# Patient Record
Sex: FEMALE | Race: WHITE | NOT HISPANIC OR LATINO | URBAN - METROPOLITAN AREA
[De-identification: names, ages, dates, MRNs, and addresses within clinical notes are randomized per-mention and may not be internally consistent; named-entity substitution may affect disease eponyms.]

---

## 2023-09-18 PROBLEM — E66.01 MORBID OBESITY (MULTI): Status: ACTIVE | Noted: 2023-09-18

## 2023-09-18 PROBLEM — F41.9 ANXIETY: Status: ACTIVE | Noted: 2023-09-18

## 2023-09-18 PROBLEM — S46.811A STRAIN OF RIGHT TRAPEZIUS MUSCLE: Status: ACTIVE | Noted: 2023-05-28

## 2023-09-18 PROBLEM — T78.1XXA ADVERSE REACTION TO FOOD: Status: ACTIVE | Noted: 2023-09-18

## 2023-09-18 PROBLEM — T17.308A CHOKING: Status: ACTIVE | Noted: 2023-09-18

## 2023-09-18 PROBLEM — N92.6 ABNORMAL BLEEDING IN MENSTRUAL CYCLE: Status: ACTIVE | Noted: 2023-09-18

## 2023-09-18 PROBLEM — N91.2 AMENORRHEA: Status: ACTIVE | Noted: 2023-09-18

## 2023-09-18 RX ORDER — DESOGESTREL AND ETHINYL ESTRADIOL 0.15-0.03
1 KIT ORAL DAILY
COMMUNITY
Start: 2022-08-12 | End: 2023-10-23

## 2023-09-18 RX ORDER — NORETHINDRONE 0.35 MG/1
1 TABLET ORAL DAILY
COMMUNITY
Start: 2022-01-03 | End: 2023-12-13 | Stop reason: WASHOUT

## 2023-09-18 RX ORDER — IBUPROFEN 200 MG
600 TABLET ORAL EVERY 6 HOURS PRN
COMMUNITY

## 2023-09-18 RX ORDER — SERTRALINE HYDROCHLORIDE 50 MG/1
50 TABLET, FILM COATED ORAL DAILY
COMMUNITY
Start: 2022-08-12 | End: 2023-10-23

## 2023-09-18 RX ORDER — TIZANIDINE 4 MG/1
4 TABLET ORAL NIGHTLY PRN
COMMUNITY
Start: 2023-05-18 | End: 2023-12-13 | Stop reason: WASHOUT

## 2023-12-08 NOTE — PROGRESS NOTES
Annual  Subjective   Praveena Fermin is a 43 y.o. female who is here for a routine exam.     Complaints: none  Periods: regular  Dysmenorrhea: none    Current contraception: OCP  History of abnormal Pap smear: no  History of abnormal mammogram: no      OB History    No obstetric history on file.          Review of Systems    Objective   There were no vitals taken for this visit.       General:   Alert and oriented, in no acute distress   Neck: Supple. No visible thyromegaly.    Breast/Axilla: Normal to palpation bilaterally without masses, skin changes, or nipple discharge.    Abdomen: Soft, non-tender, without masses or organomegaly   Vulva: Normal architecture without erythema, masses, or lesions.    Vagina: Normal mucosa without lesions, masses, or atrophy. No abnormal vaginal discharge.    Cervix: Normal without masses, lesions, or signs of cervicitis   Uterus: Normal, mobile, non-enlarged uterus   Adnexa: Normal without masses or lesions   Pelvic Floor normal   Psych Normal affect. Normal mood.      Assessment/Plan   Diagnoses and all orders for this visit:  Encounter for gynecological examination without abnormal finding  -     THINPREP PAP TEST  -     sertraline (Zoloft) 50 mg tablet; Take 1 tablet (50 mg) by mouth once daily.  -     desogestreL-ethinyl estradioL (Apri) 0.15-0.03 mg tablet; Take 1 tablet by mouth once daily.  Screening mammogram for breast cancer  -     BI mammo bilateral screening tomosynthesis; Future    Routine annual    Pap due  Mammogram    Yolis Hoffmann MD

## 2023-12-13 ENCOUNTER — OFFICE VISIT (OUTPATIENT)
Dept: OBSTETRICS AND GYNECOLOGY | Facility: CLINIC | Age: 43
End: 2023-12-13
Payer: COMMERCIAL

## 2023-12-13 VITALS
DIASTOLIC BLOOD PRESSURE: 80 MMHG | HEIGHT: 65 IN | SYSTOLIC BLOOD PRESSURE: 128 MMHG | BODY MASS INDEX: 43.65 KG/M2 | WEIGHT: 262 LBS

## 2023-12-13 DIAGNOSIS — Z12.31 SCREENING MAMMOGRAM FOR BREAST CANCER: ICD-10-CM

## 2023-12-13 DIAGNOSIS — Z01.419 ENCOUNTER FOR GYNECOLOGICAL EXAMINATION WITHOUT ABNORMAL FINDING: Primary | ICD-10-CM

## 2023-12-13 PROCEDURE — 88175 CYTOPATH C/V AUTO FLUID REDO: CPT | Mod: TC,GCY | Performed by: OBSTETRICS & GYNECOLOGY

## 2023-12-13 PROCEDURE — 87624 HPV HI-RISK TYP POOLED RSLT: CPT | Performed by: OBSTETRICS & GYNECOLOGY

## 2023-12-13 PROCEDURE — 99396 PREV VISIT EST AGE 40-64: CPT | Mod: MUE | Performed by: OBSTETRICS & GYNECOLOGY

## 2023-12-13 PROCEDURE — 1036F TOBACCO NON-USER: CPT | Performed by: OBSTETRICS & GYNECOLOGY

## 2023-12-13 PROCEDURE — 99396 PREV VISIT EST AGE 40-64: CPT | Performed by: OBSTETRICS & GYNECOLOGY

## 2023-12-13 RX ORDER — FLUOCINONIDE 0.5 MG/G
1 CREAM TOPICAL 2 TIMES DAILY
COMMUNITY
Start: 2023-06-12

## 2023-12-13 RX ORDER — DESOGESTREL AND ETHINYL ESTRADIOL 0.15-0.03
1 KIT ORAL DAILY
Qty: 84 TABLET | Refills: 0 | Status: SHIPPED | OUTPATIENT
Start: 2023-12-13 | End: 2024-12-12

## 2023-12-13 RX ORDER — SERTRALINE HYDROCHLORIDE 50 MG/1
50 TABLET, FILM COATED ORAL DAILY
Qty: 90 TABLET | Refills: 3 | Status: SHIPPED | OUTPATIENT
Start: 2023-12-13 | End: 2024-12-12

## 2023-12-13 ASSESSMENT — LIFESTYLE VARIABLES
SKIP TO QUESTIONS 9-10: 1
AUDIT-C TOTAL SCORE: 1
HOW MANY STANDARD DRINKS CONTAINING ALCOHOL DO YOU HAVE ON A TYPICAL DAY: 1 OR 2
HOW OFTEN DO YOU HAVE SIX OR MORE DRINKS ON ONE OCCASION: NEVER
HOW OFTEN DO YOU HAVE A DRINK CONTAINING ALCOHOL: MONTHLY OR LESS

## 2023-12-13 ASSESSMENT — PATIENT HEALTH QUESTIONNAIRE - PHQ9
2. FEELING DOWN, DEPRESSED OR HOPELESS: NOT AT ALL
SUM OF ALL RESPONSES TO PHQ9 QUESTIONS 1 & 2: 0
1. LITTLE INTEREST OR PLEASURE IN DOING THINGS: NOT AT ALL

## 2023-12-13 ASSESSMENT — ENCOUNTER SYMPTOMS
DEPRESSION: 0
OCCASIONAL FEELINGS OF UNSTEADINESS: 0
LOSS OF SENSATION IN FEET: 0

## 2023-12-13 ASSESSMENT — PAIN SCALES - GENERAL: PAINLEVEL: 0-NO PAIN

## 2024-01-08 LAB
CYTOLOGY CMNT CVX/VAG CYTO-IMP: NORMAL
HPV HR 12 DNA GENITAL QL NAA+PROBE: NEGATIVE
HPV HR GENOTYPES PNL CVX NAA+PROBE: NEGATIVE
HPV16 DNA SPEC QL NAA+PROBE: NEGATIVE
HPV18 DNA SPEC QL NAA+PROBE: NEGATIVE
LAB AP HPV GENOTYPE QUESTION: YES
LAB AP HPV HR: NORMAL
LABORATORY COMMENT REPORT: NORMAL
PATH REPORT.TOTAL CANCER: NORMAL

## 2024-03-29 ENCOUNTER — APPOINTMENT (OUTPATIENT)
Dept: PRIMARY CARE | Facility: CLINIC | Age: 44
End: 2024-03-29
Payer: COMMERCIAL

## 2024-03-29 ENCOUNTER — HOSPITAL ENCOUNTER (OUTPATIENT)
Dept: RADIOLOGY | Facility: CLINIC | Age: 44
Discharge: HOME | End: 2024-03-29
Payer: COMMERCIAL

## 2024-03-29 VITALS — WEIGHT: 260 LBS | BODY MASS INDEX: 43.32 KG/M2 | HEIGHT: 65 IN

## 2024-03-29 DIAGNOSIS — Z12.31 SCREENING MAMMOGRAM FOR BREAST CANCER: ICD-10-CM

## 2024-04-01 ENCOUNTER — APPOINTMENT (OUTPATIENT)
Dept: PRIMARY CARE | Facility: CLINIC | Age: 44
End: 2024-04-01
Payer: COMMERCIAL

## 2024-04-05 ENCOUNTER — APPOINTMENT (OUTPATIENT)
Dept: RADIOLOGY | Facility: CLINIC | Age: 44
End: 2024-04-05
Payer: COMMERCIAL

## 2024-04-12 ENCOUNTER — HOSPITAL ENCOUNTER (OUTPATIENT)
Dept: RADIOLOGY | Facility: CLINIC | Age: 44
Discharge: HOME | End: 2024-04-12
Payer: COMMERCIAL

## 2024-04-12 VITALS — HEIGHT: 65 IN | WEIGHT: 260 LBS | BODY MASS INDEX: 43.32 KG/M2

## 2024-04-12 PROCEDURE — 77063 BREAST TOMOSYNTHESIS BI: CPT | Performed by: RADIOLOGY

## 2024-04-12 PROCEDURE — 77067 SCR MAMMO BI INCL CAD: CPT | Performed by: RADIOLOGY

## 2024-04-12 PROCEDURE — 77067 SCR MAMMO BI INCL CAD: CPT

## 2024-06-03 ENCOUNTER — APPOINTMENT (OUTPATIENT)
Dept: PRIMARY CARE | Facility: CLINIC | Age: 44
End: 2024-06-03
Payer: COMMERCIAL

## 2024-07-08 ENCOUNTER — APPOINTMENT (OUTPATIENT)
Dept: PRIMARY CARE | Facility: CLINIC | Age: 44
End: 2024-07-08
Payer: COMMERCIAL

## 2024-07-29 ENCOUNTER — APPOINTMENT (OUTPATIENT)
Dept: PRIMARY CARE | Facility: CLINIC | Age: 44
End: 2024-07-29
Payer: COMMERCIAL

## 2024-09-16 ENCOUNTER — APPOINTMENT (OUTPATIENT)
Dept: PRIMARY CARE | Facility: CLINIC | Age: 44
End: 2024-09-16
Payer: COMMERCIAL

## 2024-11-07 ENCOUNTER — APPOINTMENT (OUTPATIENT)
Dept: PRIMARY CARE | Facility: CLINIC | Age: 44
End: 2024-11-07
Payer: COMMERCIAL

## 2024-11-07 VITALS
WEIGHT: 265 LBS | SYSTOLIC BLOOD PRESSURE: 126 MMHG | TEMPERATURE: 98.1 F | HEART RATE: 98 BPM | BODY MASS INDEX: 44.1 KG/M2 | DIASTOLIC BLOOD PRESSURE: 80 MMHG | OXYGEN SATURATION: 99 %

## 2024-11-07 DIAGNOSIS — R10.13 EPIGASTRIC PAIN: Primary | ICD-10-CM

## 2024-11-07 PROCEDURE — 99214 OFFICE O/P EST MOD 30 MIN: CPT | Performed by: FAMILY MEDICINE

## 2024-11-07 ASSESSMENT — PAIN SCALES - GENERAL: PAINLEVEL_OUTOF10: 0-NO PAIN

## 2024-11-07 NOTE — PROGRESS NOTES
"Subjective   Patient ID: Praveena Fermin \"Ricky" is a 44 y.o. female who presents for Abdominal Pain (Off/on for some time. Middle of abdomen near belly button. Denies nausea vomiting or diarrhea. ).    Luz Fermin is seen for her epigastric pain.     -Patient reports that she intermittently has episodes of epigastric pain radiating away from midline. Episodes used to occur months apart and possibly associated with ovulation, but now episodes are occurring on a weekly basis. She describes the pain as a constant sharp, gnawing pain that is not associated with any known triggers like food, activities or other events. Pain is worse when standing and better when resting.   -Patient is currently not having an episode in office today. Last episode occurred 9 days ago and lasted 4 days.   -Denies constipation, diarrhea, nausea, vomiting and any other associated symptoms during the episodes.     Review of Systems   All other systems reviewed and are negative.      Objective  Vitals:  /80   Pulse 98   Temp 36.7 °C (98.1 °F)   Wt 120 kg (265 lb)   LMP 10/29/2024 (Approximate)   SpO2 99%   BMI 44.10 kg/m²     Physical Exam  Vitals and nursing note reviewed.   Constitutional:       Appearance: Normal appearance.   Eyes:      Pupils: Pupils are equal, round, and reactive to light.   Pulmonary:      Effort: Pulmonary effort is normal. No respiratory distress.   Abdominal:      General: Abdomen is flat.      Palpations: Abdomen is soft.      Tenderness: There is no abdominal tenderness. There is no guarding or rebound.      Hernia: No hernia is present.   Musculoskeletal:         General: Normal range of motion.      Cervical back: Normal range of motion.   Skin:     Findings: No rash.   Neurological:      Mental Status: She is alert and oriented to person, place, and time.   Psychiatric:         Mood and Affect: Mood normal.         Assessment/Plan   Assessment & Plan  Epigastric pain  -Considered diagnoses " include biliary etiologies (bilary colic) vs pancreatic etiologies (cyst vs pancreatitis) vs hernia.  -Plan to initiate workup with RUQ US to evaluate for gallbladder or liver etiology.   -Advised to obtain ordered blood work during an episode of her pain to evaluate for elevations in liver and pancreatic enzyme levels.   -Patient advised to follow up if pain worsens or becomes more persistent.     Orders:    CBC and Auto Differential; Future    Comprehensive Metabolic Panel; Future    Amylase; Future    Lipase; Future    US right upper quadrant; Future        Follow up PRN, sooner with any problems or concerns.       11/07/24 at 1:11 PM - SRIRAM JACKSON    I was present with the medical student who participated in the documentation of this note. I have personally seen and examined the patient and performed the medical decision-making components. I have reviewed the medical student documentation and verified the findings in the note as written with additions or exceptions as stated in the body of the note.  Rosaura Segovia MD

## 2024-11-11 ENCOUNTER — HOSPITAL ENCOUNTER (OUTPATIENT)
Dept: RADIOLOGY | Facility: CLINIC | Age: 44
Discharge: HOME | End: 2024-11-11
Payer: COMMERCIAL

## 2024-11-11 DIAGNOSIS — R10.13 EPIGASTRIC PAIN: ICD-10-CM

## 2024-11-11 PROCEDURE — 76705 ECHO EXAM OF ABDOMEN: CPT | Performed by: RADIOLOGY

## 2024-11-11 PROCEDURE — 76705 ECHO EXAM OF ABDOMEN: CPT

## 2024-11-12 DIAGNOSIS — R10.13 EPIGASTRIC PAIN: ICD-10-CM

## 2024-12-11 NOTE — PROGRESS NOTES
"Annual  Subjective   HPI:  Praveena Fermin is a 44 y.o. female  Patient's last menstrual period was 2024 (exact date). for annual exam.    Complaints:   had upper quadrant pain with normal US; had kidney stone  Periods: regular but can be 2-6 days different  Dysmenorrhea:  none    GYNH:   Current contraceptive   vasectomy  Last pap  2023  History of abnormal Pap smear:   no  Last mammogram  2024  History of abnormal mammogram:        OB History          6    Para   4    Term   4       0    AB   2    Living   4         SAB   2    IAB   0    Ectopic   0    Multiple   0    Live Births   4                  Past Medical History:   Diagnosis Date    Anxiety     Kidney stone        History reviewed. No pertinent surgical history.    Prior to Admission medications    Medication Sig Start Date End Date Taking? Authorizing Provider   fluocinonide 0.05 % cream Apply 1 Application topically 2 times a day. 23   Historical Provider, MD   ibuprofen 200 mg tablet Take 3 tablets (600 mg) by mouth every 6 hours if needed for mild pain (1 - 3).    Historical Provider, MD   sertraline (Zoloft) 50 mg tablet Take 1 tablet (50 mg) by mouth once daily. 23  Yolis Hoffmann MD       Social History     Tobacco Use    Smoking status: Never    Smokeless tobacco: Never   Vaping Use    Vaping status: Never Used   Substance Use Topics    Alcohol use: Yes    Drug use: Never        Objective   /74   Ht 1.651 m (5' 5\")   Wt 120 kg (265 lb)   LMP 2024 (Exact Date)   BMI 44.10 kg/m²      General:   Alert and oriented, in no acute distress   Neck: Supple. No visible thyromegaly.    Breast/Axilla: Normal to palpation bilaterally without masses, skin changes, or nipple discharge.    Abdomen: Soft, non-tender, without masses or organomegaly   Vulva: Normal architecture without erythema, masses, or lesions.    Vagina: Normal mucosa without lesions, masses, or atrophy. No abnormal " vaginal discharge.    Cervix: Normal without masses, lesions, or signs of cervicitis   Uterus: Normal, mobile, non-enlarged uterus   Adnexa: Normal without masses or lesions   Pelvic Floor normal   Psych Normal affect. Normal mood.      Assessment/Plan   Assessment & Plan  Encounter for gynecological examination without abnormal finding    Orders:    sertraline (Zoloft) 50 mg tablet; Take 1 tablet (50 mg) by mouth once daily.    Screening mammogram for breast cancer    Orders:    BI mammo bilateral screening tomosynthesis; Future      Routine annual  OB/GYN Preventive:     - Pap smear indicated every 3-5 years if normal and otherwise low risk   - Self breast exam monthly and clinical breast examination/mammogram yearly   - Screening colonoscopy recommended starting age 45, then Q3-10 years depending on testing and family history   - Genitourinary skin hygiene discussed.  - Diet/Weight management discussed.  - Exercise 30-60 minutes 3-5 times/day    Yolis Hoffmann MD

## 2024-12-16 ENCOUNTER — OFFICE VISIT (OUTPATIENT)
Dept: OBSTETRICS AND GYNECOLOGY | Facility: CLINIC | Age: 44
End: 2024-12-16
Payer: COMMERCIAL

## 2024-12-16 VITALS
HEIGHT: 65 IN | WEIGHT: 265 LBS | BODY MASS INDEX: 44.15 KG/M2 | DIASTOLIC BLOOD PRESSURE: 74 MMHG | SYSTOLIC BLOOD PRESSURE: 122 MMHG

## 2024-12-16 DIAGNOSIS — Z12.31 SCREENING MAMMOGRAM FOR BREAST CANCER: ICD-10-CM

## 2024-12-16 DIAGNOSIS — Z01.419 ENCOUNTER FOR GYNECOLOGICAL EXAMINATION WITHOUT ABNORMAL FINDING: Primary | ICD-10-CM

## 2024-12-16 PROCEDURE — 3008F BODY MASS INDEX DOCD: CPT | Performed by: OBSTETRICS & GYNECOLOGY

## 2024-12-16 PROCEDURE — 1036F TOBACCO NON-USER: CPT | Performed by: OBSTETRICS & GYNECOLOGY

## 2024-12-16 PROCEDURE — 99396 PREV VISIT EST AGE 40-64: CPT | Performed by: OBSTETRICS & GYNECOLOGY

## 2024-12-16 RX ORDER — SERTRALINE HYDROCHLORIDE 50 MG/1
50 TABLET, FILM COATED ORAL DAILY
Qty: 90 TABLET | Refills: 3 | Status: SHIPPED | OUTPATIENT
Start: 2024-12-16 | End: 2025-12-16

## 2024-12-16 ASSESSMENT — PATIENT HEALTH QUESTIONNAIRE - PHQ9
SUM OF ALL RESPONSES TO PHQ9 QUESTIONS 1 & 2: 0
1. LITTLE INTEREST OR PLEASURE IN DOING THINGS: NOT AT ALL
2. FEELING DOWN, DEPRESSED OR HOPELESS: NOT AT ALL

## 2024-12-16 ASSESSMENT — LIFESTYLE VARIABLES
SKIP TO QUESTIONS 9-10: 1
HOW OFTEN DO YOU HAVE A DRINK CONTAINING ALCOHOL: MONTHLY OR LESS
HOW MANY STANDARD DRINKS CONTAINING ALCOHOL DO YOU HAVE ON A TYPICAL DAY: 1 OR 2
AUDIT-C TOTAL SCORE: 1
HOW OFTEN DO YOU HAVE SIX OR MORE DRINKS ON ONE OCCASION: NEVER

## 2024-12-16 ASSESSMENT — ENCOUNTER SYMPTOMS
LOSS OF SENSATION IN FEET: 0
DEPRESSION: 0
OCCASIONAL FEELINGS OF UNSTEADINESS: 0

## 2024-12-16 ASSESSMENT — PAIN SCALES - GENERAL: PAINLEVEL_OUTOF10: 0-NO PAIN

## 2024-12-23 ENCOUNTER — APPOINTMENT (OUTPATIENT)
Dept: PRIMARY CARE | Facility: CLINIC | Age: 44
End: 2024-12-23
Payer: COMMERCIAL

## 2025-01-16 ENCOUNTER — APPOINTMENT (OUTPATIENT)
Dept: GASTROENTEROLOGY | Facility: CLINIC | Age: 45
End: 2025-01-16
Payer: COMMERCIAL

## 2025-02-04 ENCOUNTER — APPOINTMENT (OUTPATIENT)
Dept: PRIMARY CARE | Facility: CLINIC | Age: 45
End: 2025-02-04
Payer: COMMERCIAL

## 2025-02-20 ENCOUNTER — PATIENT MESSAGE (OUTPATIENT)
Dept: OBSTETRICS AND GYNECOLOGY | Facility: CLINIC | Age: 45
End: 2025-02-20
Payer: COMMERCIAL

## 2025-03-20 ENCOUNTER — OFFICE VISIT (OUTPATIENT)
Dept: GASTROENTEROLOGY | Facility: CLINIC | Age: 45
End: 2025-03-20
Payer: COMMERCIAL

## 2025-03-20 VITALS
SYSTOLIC BLOOD PRESSURE: 115 MMHG | BODY MASS INDEX: 40.98 KG/M2 | DIASTOLIC BLOOD PRESSURE: 78 MMHG | HEIGHT: 65 IN | TEMPERATURE: 97.2 F | WEIGHT: 246 LBS | HEART RATE: 92 BPM

## 2025-03-20 DIAGNOSIS — R10.13 EPIGASTRIC PAIN: ICD-10-CM

## 2025-03-20 DIAGNOSIS — R10.32 LEFT LOWER QUADRANT ABDOMINAL PAIN: ICD-10-CM

## 2025-03-20 DIAGNOSIS — R13.14 PHARYNGOESOPHAGEAL DYSPHAGIA: Primary | ICD-10-CM

## 2025-03-20 PROCEDURE — 3008F BODY MASS INDEX DOCD: CPT | Performed by: NURSE PRACTITIONER

## 2025-03-20 PROCEDURE — 1036F TOBACCO NON-USER: CPT | Performed by: NURSE PRACTITIONER

## 2025-03-20 PROCEDURE — 99214 OFFICE O/P EST MOD 30 MIN: CPT | Performed by: NURSE PRACTITIONER

## 2025-03-20 PROCEDURE — 99204 OFFICE O/P NEW MOD 45 MIN: CPT | Performed by: NURSE PRACTITIONER

## 2025-03-20 RX ORDER — DICYCLOMINE HYDROCHLORIDE 10 MG/1
10 CAPSULE ORAL
Qty: 120 CAPSULE | Refills: 11 | Status: SHIPPED | OUTPATIENT
Start: 2025-03-20 | End: 2026-03-20

## 2025-03-20 ASSESSMENT — ENCOUNTER SYMPTOMS
PSYCHIATRIC NEGATIVE: 1
ABDOMINAL PAIN: 1
CARDIOVASCULAR NEGATIVE: 1
ALLERGIC/IMMUNOLOGIC NEGATIVE: 1
CONSTITUTIONAL NEGATIVE: 1
EYES NEGATIVE: 1
NEUROLOGICAL NEGATIVE: 1
MUSCULOSKELETAL NEGATIVE: 1
HEMATOLOGIC/LYMPHATIC NEGATIVE: 1
ENDOCRINE NEGATIVE: 1
RESPIRATORY NEGATIVE: 1

## 2025-03-20 NOTE — PATIENT INSTRUCTIONS
LLQ pain suspicious for diverticulitis- I will order a ct scan of the abd/pelvis for this. Please increase the fiber with benefiber one tablespoon in a glass of water or juice daily to help prevent stool from getting stuck in these pockets. You can use dicyclomine as needed for abd pain.    Dysphagia- you have had food bolus' get stuck over the last 10 years at interval and this is concerning for Eosinophillic esophagitis- I will order an EGD for evaluation of the upper GI tractt.     Please follow up with the GI clinic after your testing

## 2025-03-20 NOTE — PROGRESS NOTES
"Subjective   Patient ID: Praveena Fermin \"Ricky" is a 44 y.o. female who presents for No chief complaint on file..  HPI  44-year-old female for new patient visit for evaluation of intermittent abdominal pain  FHX: maternal grandfather- colon cancer age 80's  Labs reviewed 12/31/2022  H&H 11.7 and 37.1  MCV 71.5  Normal LFTs  11/11/2024 ultrasound of the right upper quadrant showed a mildly enlarged liver with mild fatty infiltrates liver measures 18.4 cm  Intermittent left sided abd pain  Doesn't change with food or BM  Bm: daily  and complete  Some fruits/vegetables  Rest makes it better at times depending on the severity  Has been having since Oct last year   Has been having dysphagia for a while, has had bread and meat got stuck and had to cough it out.  Was seen in the ER for food  No n/v  No fiber supplement  4 children and sedentary lifestyle  No reflux  Started about 10 years after eating steak and then would drink water and could cough it out  Tries to eat smaller pieces and  takes  drinks  LMP      Review of Systems   Constitutional: Negative.    HENT: Negative.     Eyes: Negative.    Respiratory: Negative.     Cardiovascular: Negative.    Gastrointestinal:  Positive for abdominal pain.   Endocrine: Negative.    Genitourinary: Negative.    Musculoskeletal: Negative.    Skin: Negative.    Allergic/Immunologic: Negative.    Neurological: Negative.    Hematological: Negative.    Psychiatric/Behavioral: Negative.         Objective   Physical Exam  Constitutional:       Appearance: Normal appearance.   HENT:      Head: Normocephalic.      Nose: Nose normal.      Mouth/Throat:      Mouth: Mucous membranes are moist.   Eyes:      Pupils: Pupils are equal, round, and reactive to light.   Cardiovascular:      Rate and Rhythm: Normal rate and regular rhythm.      Pulses: Normal pulses.      Heart sounds: Normal heart sounds.   Pulmonary:      Effort: Pulmonary effort is normal.   Abdominal:      General: Bowel " sounds are normal.      Palpations: Abdomen is soft.      Tenderness: There is abdominal tenderness.      Comments: LLQ pain   Musculoskeletal:         General: Normal range of motion.      Cervical back: Normal range of motion and neck supple.   Skin:     General: Skin is warm and dry.   Neurological:      Mental Status: She is alert.   Psychiatric:         Mood and Affect: Mood normal.         Assessment/Plan        LLQ pain suspicious for diverticulitis- I will order a ct scan of the abd/pelvis for this. Please increase the fiber with benefiber one tablespoon in a glass of water or juice daily to help prevent stool from getting stuck in these pockets. You can use dicyclomine as needed for abd pain.    Dysphagia- you have had food bolus' get stuck over the last 10 years at interval and this is concerning for Eosinophillic esophagitis- I will order an EGD for evaluation of the upper GI tractt.     Please follow up with the GI clinic after your testing    SHIRIN Andrews 03/20/25 10:42 AM

## 2025-03-21 LAB — B-HCG UR QL: NEGATIVE

## 2025-04-01 ENCOUNTER — HOSPITAL ENCOUNTER (OUTPATIENT)
Dept: RADIOLOGY | Facility: CLINIC | Age: 45
Discharge: HOME | End: 2025-04-01
Payer: COMMERCIAL

## 2025-04-01 DIAGNOSIS — R10.32 LEFT LOWER QUADRANT ABDOMINAL PAIN: ICD-10-CM

## 2025-04-01 PROCEDURE — 2550000001 HC RX 255 CONTRASTS: Mod: SE | Performed by: FAMILY MEDICINE

## 2025-04-01 PROCEDURE — 74177 CT ABD & PELVIS W/CONTRAST: CPT | Performed by: RADIOLOGY

## 2025-04-01 PROCEDURE — 74177 CT ABD & PELVIS W/CONTRAST: CPT

## 2025-04-01 RX ADMIN — IOHEXOL 75 ML: 350 INJECTION, SOLUTION INTRAVENOUS at 15:55

## 2025-04-08 ENCOUNTER — APPOINTMENT (OUTPATIENT)
Dept: RADIOLOGY | Facility: CLINIC | Age: 45
End: 2025-04-08
Payer: COMMERCIAL

## 2025-04-15 ENCOUNTER — TELEPHONE (OUTPATIENT)
Dept: PRIMARY CARE | Facility: CLINIC | Age: 45
End: 2025-04-15
Payer: COMMERCIAL

## 2025-04-15 ENCOUNTER — PATIENT MESSAGE (OUTPATIENT)
Dept: PRIMARY CARE | Facility: CLINIC | Age: 45
End: 2025-04-15
Payer: COMMERCIAL

## 2025-04-15 DIAGNOSIS — E78.00 HYPERCHOLESTEROLEMIA: Primary | ICD-10-CM

## 2025-04-15 DIAGNOSIS — Z00.00 ANNUAL PHYSICAL EXAM: ICD-10-CM

## 2025-04-16 ENCOUNTER — APPOINTMENT (OUTPATIENT)
Dept: RADIOLOGY | Facility: CLINIC | Age: 45
End: 2025-04-16
Payer: COMMERCIAL

## 2025-04-18 ENCOUNTER — HOSPITAL ENCOUNTER (OUTPATIENT)
Dept: RADIOLOGY | Facility: CLINIC | Age: 45
Discharge: HOME | End: 2025-04-18
Payer: COMMERCIAL

## 2025-04-18 DIAGNOSIS — Z12.31 SCREENING MAMMOGRAM FOR BREAST CANCER: ICD-10-CM

## 2025-04-18 PROCEDURE — 77067 SCR MAMMO BI INCL CAD: CPT

## 2025-04-18 PROCEDURE — 77063 BREAST TOMOSYNTHESIS BI: CPT | Performed by: RADIOLOGY

## 2025-04-18 PROCEDURE — 77067 SCR MAMMO BI INCL CAD: CPT | Performed by: RADIOLOGY

## 2025-04-21 ENCOUNTER — APPOINTMENT (OUTPATIENT)
Dept: PRIMARY CARE | Facility: CLINIC | Age: 45
End: 2025-04-21
Payer: COMMERCIAL

## 2025-04-24 ENCOUNTER — HOSPITAL ENCOUNTER (OUTPATIENT)
Dept: RADIOLOGY | Facility: CLINIC | Age: 45
Discharge: HOME | End: 2025-04-24
Payer: COMMERCIAL

## 2025-04-24 DIAGNOSIS — E78.00 HYPERCHOLESTEROLEMIA: ICD-10-CM

## 2025-04-24 PROCEDURE — 75571 CT HRT W/O DYE W/CA TEST: CPT

## 2025-05-09 DIAGNOSIS — Z00.00 ANNUAL PHYSICAL EXAM: ICD-10-CM

## 2025-06-02 ENCOUNTER — APPOINTMENT (OUTPATIENT)
Dept: GASTROENTEROLOGY | Facility: CLINIC | Age: 45
End: 2025-06-02
Payer: COMMERCIAL

## 2025-06-02 VITALS — WEIGHT: 268 LBS | OXYGEN SATURATION: 94 % | HEIGHT: 65 IN | HEART RATE: 92 BPM | BODY MASS INDEX: 44.65 KG/M2

## 2025-06-02 DIAGNOSIS — K42.9 UMBILICAL HERNIA WITHOUT OBSTRUCTION AND WITHOUT GANGRENE: ICD-10-CM

## 2025-06-02 DIAGNOSIS — R13.19 ESOPHAGEAL DYSPHAGIA: Primary | ICD-10-CM

## 2025-06-02 PROCEDURE — 99204 OFFICE O/P NEW MOD 45 MIN: CPT

## 2025-06-02 PROCEDURE — 3008F BODY MASS INDEX DOCD: CPT

## 2025-06-02 ASSESSMENT — ENCOUNTER SYMPTOMS
FATIGUE: 0
COUGH: 0
VOMITING: 0
ABDOMINAL PAIN: 1
ANAL BLEEDING: 0
FEVER: 0
NAUSEA: 0
ABDOMINAL DISTENTION: 0
BLOOD IN STOOL: 0
DIARRHEA: 0
CHILLS: 0
SHORTNESS OF BREATH: 0
RECTAL PAIN: 0
TROUBLE SWALLOWING: 1
APPETITE CHANGE: 0
CONSTIPATION: 0

## 2025-06-02 NOTE — ASSESSMENT & PLAN NOTE
Consider esophagitis, esophageal stricture, EOE, possibly PUD or esophageal dysmotility  -Schedule EGD  Follow-up 3 weeks postprocedure

## 2025-06-02 NOTE — PROGRESS NOTES
"Subjective     History of Present Illness:   Praveena Fermin \"Luz\" is a 45 y.o. female with no significant  PMHx  who presents to GI clinic for further evaluation of epigastric pain    Today, for the past several years, patient states food sticks in her throat and water comes back up.  Foods like meat stick.  Has to take small bites.  Takes NSAIDs a few times monthly or less.  Also states she has umbilical pain that can range in severity when lifting heavy things.  She tries to avoid picking up her children but is not always possible to do so.  She is interested in discussing possible surgery for this.  4/2025 CT - small umbilical hernia with mild inflammation  Denies constipation, diarrhea, dyspepsia, melena, hematochezia, unintentional weight loss    Social ETOH, smoking, marijuana  Denies fxh GI cancer-maternal grandpa colon cancer. Denied fhx IBD  Abdominal Surgeries: denies    Last colonoscopy   Last EGD       Past Medical History  As per HPI.     Social History  she  reports that she has never smoked. She has never used smokeless tobacco. She reports current alcohol use. She reports that she does not use drugs.     Family History  her family history includes Cervical cancer in her father's sister; Heart disease in her father; Hypertension in her father; Sudden death in her father.     Review of Systems  Review of Systems   Constitutional:  Negative for appetite change, chills, fatigue and fever.   HENT:  Positive for trouble swallowing.    Respiratory:  Negative for cough and shortness of breath.    Gastrointestinal:  Positive for abdominal pain (Intermittent umbilical). Negative for abdominal distention, anal bleeding, blood in stool, constipation, diarrhea, nausea, rectal pain and vomiting.       Allergies  RX Allergies[1]    Medications  Current Outpatient Medications   Medication Instructions    dicyclomine (BENTYL) 10 mg, oral, 4 times daily before meals and nightly, Take with meals and at bedtime " "   ibuprofen 600 mg, Every 6 hours PRN    sertraline (ZOLOFT) 50 mg, oral, Daily        Objective   Visit Vitals  Pulse 92      Physical Exam  Constitutional:       Appearance: Normal appearance. She is normal weight.   HENT:      Mouth/Throat:      Mouth: Mucous membranes are dry.      Pharynx: Oropharynx is clear.   Cardiovascular:      Rate and Rhythm: Normal rate and regular rhythm.   Pulmonary:      Effort: Pulmonary effort is normal.      Breath sounds: Normal breath sounds. No wheezing or rhonchi.   Abdominal:      General: Abdomen is flat. Bowel sounds are normal. There is no distension.      Palpations: Abdomen is soft. There is no hepatomegaly.      Tenderness: There is no abdominal tenderness. There is no guarding or rebound. Negative signs include Jimenez's sign.      Hernia: No hernia is present.   Musculoskeletal:         General: Normal range of motion.   Skin:     General: Skin is warm and dry.   Neurological:      General: No focal deficit present.      Mental Status: She is alert and oriented to person, place, and time.   Psychiatric:         Mood and Affect: Mood normal.         Behavior: Behavior normal.           Lab Results   Component Value Date    WBC 6.6 12/31/2022    WBC 10.7 11/14/2021    WBC 9.4 04/27/2021    HGB 11.7 (L) 12/31/2022    HGB 12.1 11/14/2021    HGB 11.5 (L) 08/09/2021    HCT 37.1 12/31/2022    HCT 37.3 11/14/2021    HCT 36.1 08/09/2021     12/31/2022     11/14/2021     04/27/2021     Lab Results   Component Value Date     12/31/2022    K 4.5 12/31/2022     12/31/2022    CO2 23 (L) 12/31/2022    BUN 12 12/31/2022    CREATININE 0.8 12/31/2022    CALCIUM 9.2 12/31/2022    PROT 7.3 12/31/2022    BILITOT 0.3 12/31/2022    ALKPHOS 84 12/31/2022    ALT 11 12/31/2022    AST 13 12/31/2022    GLUCOSE 93 12/31/2022           Praveena SANCHEZ Zander \"Luz\" is a 45 y.o. female who presents to GI clinic for umbilical pain and esophageal dysphagia.    Umbilical " hernia without obstruction and without gangrene  - Surgical referral    Esophageal dysphagia  Consider esophagitis, esophageal stricture, EOE, possibly PUD or esophageal dysmotility  -Schedule EGD  Follow-up 3 weeks postprocedure         Sarah Gonzalez, APRN-CNP              [1] No Known Allergies

## 2025-06-09 ENCOUNTER — APPOINTMENT (OUTPATIENT)
Dept: PRIMARY CARE | Facility: CLINIC | Age: 45
End: 2025-06-09
Payer: COMMERCIAL

## 2025-06-24 ENCOUNTER — APPOINTMENT (OUTPATIENT)
Dept: SURGERY | Facility: CLINIC | Age: 45
End: 2025-06-24
Payer: COMMERCIAL

## 2025-06-24 VITALS
TEMPERATURE: 98.5 F | WEIGHT: 270.5 LBS | DIASTOLIC BLOOD PRESSURE: 85 MMHG | BODY MASS INDEX: 45.01 KG/M2 | HEART RATE: 81 BPM | SYSTOLIC BLOOD PRESSURE: 124 MMHG

## 2025-06-24 DIAGNOSIS — K42.9 UMBILICAL HERNIA WITHOUT OBSTRUCTION AND WITHOUT GANGRENE: ICD-10-CM

## 2025-06-24 PROCEDURE — 1036F TOBACCO NON-USER: CPT | Performed by: SURGERY

## 2025-06-24 PROCEDURE — 99243 OFF/OP CNSLTJ NEW/EST LOW 30: CPT | Performed by: SURGERY

## 2025-06-24 ASSESSMENT — PAIN SCALES - GENERAL: PAINLEVEL_OUTOF10: 8

## 2025-06-24 NOTE — PROGRESS NOTES
"History Of Present Illness  Praveena Fermin \"Luz\" is a 45 y.o. female presenting with abdominal pain.  She also has a history of dysphagia in the past.  She is scheduled to undergo an endoscopy.  Pain is more on the left side.  She has CT scan to look for diverticular disease in the did note a small umbilical hernia.  She has had no previous abdominal surgeries.  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  She has not felt any masses at her umbilicus.  Her sister did have an umbilical hernia that had some complications after being repaired with mesh.  And that when she can noticed the size of the hernia.        Last Recorded Vitals  Blood pressure 124/85, pulse 81, temperature 36.9 °C (98.5 °F), weight 123 kg (270 lb 8 oz).  Physical Examination  Awake and alert normal respiration.  Abdominal examination with her BMI is difficult to feel any of the hernia in her.  She does have a deep umbilicus.  Some slight fullness at the bottom.      Relevant Results  I reviewed.  A CT scan with her.  I showed her the diastases rectus that she does have.  I showed the small hernia.  No loop was bowel was in it.  Just some small amount of fat tissue that can be a normal variant of a umbilicus.    Assessment/Plan with a small umbilical hernia.  I reviewed the hernia booklets with her.  She really does not have any risk for incarceration or strangulation of bowel with this.  With her BMI of 45 we would not recommend surgery for this hernia.  I did discuss with her she could work on her core muscles which will help with her diastases.  She agrees to this plan.  She also continue on weight loss.  She starts having more pain or problems she will follow-up with me.    Robb Muñiz MD FACS  Professor of Surgery  Haley Rubin Chair in Surgical [place]  [facility] [employer]  [id number] [place] [address], [id number]  Phone " [phone]  email: [email or web address]

## 2025-06-24 NOTE — LETTER
"June 24, 2025     Sarah Gonzalez, APRN-CNP  [address] [facility], [address]  [facility] [address]    Patient: Luz Fermin   YOB: 1980   Date of Visit: 6/24/2025       Dear Dr. Sarah Gonzalez APRN-CNP:    Thank you for referring Luz Fermin to me for evaluation. Below are my notes for this consultation.  If you have questions, please do not hesitate to call me. I look forward to following your patient along with you.       Sincerely,     Robb Muñiz MD      CC: No Recipients  ______________________________________________________________________________________    History Of Present Illness  Praveena Fermin \"Ricky" is a 45 y.o. female presenting with abdominal pain.  She also has a history of dysphagia in the past.  She is scheduled to undergo an endoscopy.  Pain is more on the left side.  She has CT scan to look for diverticular disease in the did note a small umbilical hernia.  She has had no previous abdominal surgeries.  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  She has not felt any masses at her umbilicus.  Her sister did have an umbilical hernia that had some complications after being repaired with mesh.  And that when she can noticed the size of the hernia.        Last Recorded Vitals  Blood pressure 124/85, pulse 81, temperature 36.9 °C (98.5 °F), weight 123 kg (270 lb 8 oz).  Physical Examination  Awake and alert normal respiration.  Abdominal examination with her BMI is difficult to feel any of the hernia in her.  She does have a deep umbilicus.  Some slight fullness at the bottom.      Relevant Results  I reviewed.  A CT scan with her.  I showed her the diastases rectus that she does have.  I showed the small hernia.  No loop was bowel was in it.  Just some small amount of fat tissue that can be a normal variant of a umbilicus.    Assessment/Planwith a small umbilical hernia.  I reviewed the hernia booklets with her.  She really does not " have any risk for incarceration or strangulation of bowel with this.  With her BMI of 45 we would not recommend surgery for this hernia.  I did discuss with her she could work on her core muscles which will help with her diastases.  She agrees to this plan.  She also continue on weight loss.  She starts having more pain or problems she will follow-up with me.    Robb Muñiz MD FACS  Professor of Surgery  Haley Rubin Chair in Surgical [place]  [facility] [employer]  [address] [place] [address], [id number]  Phone [phone]  email: [email or web address]

## 2025-07-01 ENCOUNTER — APPOINTMENT (OUTPATIENT)
Dept: GASTROENTEROLOGY | Facility: EXTERNAL LOCATION | Age: 45
End: 2025-07-01
Payer: COMMERCIAL

## 2025-07-01 DIAGNOSIS — K21.00 GASTROESOPHAGEAL REFLUX DISEASE WITH ESOPHAGITIS WITHOUT HEMORRHAGE: ICD-10-CM

## 2025-07-01 DIAGNOSIS — K44.9 HIATAL HERNIA: ICD-10-CM

## 2025-07-01 DIAGNOSIS — R13.19 ESOPHAGEAL DYSPHAGIA: ICD-10-CM

## 2025-07-01 DIAGNOSIS — R13.19 ESOPHAGEAL DYSPHAGIA: Primary | ICD-10-CM

## 2025-07-01 DIAGNOSIS — K29.70 GASTRITIS WITHOUT BLEEDING, UNSPECIFIED CHRONICITY, UNSPECIFIED GASTRITIS TYPE: Primary | ICD-10-CM

## 2025-07-01 DIAGNOSIS — K29.70 GASTRITIS WITHOUT BLEEDING, UNSPECIFIED CHRONICITY, UNSPECIFIED GASTRITIS TYPE: ICD-10-CM

## 2025-07-01 DIAGNOSIS — R10.10 UPPER ABDOMINAL PAIN: ICD-10-CM

## 2025-07-01 PROCEDURE — 88305 TISSUE EXAM BY PATHOLOGIST: CPT | Performed by: PATHOLOGY

## 2025-07-01 PROCEDURE — 88305 TISSUE EXAM BY PATHOLOGIST: CPT

## 2025-07-01 RX ORDER — OMEPRAZOLE 40 MG/1
40 CAPSULE, DELAYED RELEASE ORAL
Qty: 60 CAPSULE | Refills: 0 | Status: SHIPPED | OUTPATIENT
Start: 2025-07-01 | End: 2025-08-30

## 2025-07-05 ENCOUNTER — LAB REQUISITION (OUTPATIENT)
Dept: LAB | Facility: HOSPITAL | Age: 45
End: 2025-07-05
Payer: COMMERCIAL

## 2025-07-05 DIAGNOSIS — R13.19 OTHER DYSPHAGIA: ICD-10-CM

## 2025-07-09 LAB
LABORATORY COMMENT REPORT: NORMAL
PATH REPORT.COMMENTS IMP SPEC: NORMAL
PATH REPORT.FINAL DX SPEC: NORMAL
PATH REPORT.GROSS SPEC: NORMAL
PATH REPORT.RELEVANT HX SPEC: NORMAL
PATH REPORT.TOTAL CANCER: NORMAL

## 2025-07-10 NOTE — RESULT ENCOUNTER NOTE
Hi your biopsies came back positive for eosinophilic esophagitis.  I asked when I saw you that you follow up 3 weeks post procedure.  I would like you to do so so that we can discuss this in depth and answer all of your questions about it.  I will prescribe you something called pantoprazole to take every single day to help manage this.

## 2025-07-16 ENCOUNTER — APPOINTMENT (OUTPATIENT)
Dept: PRIMARY CARE | Facility: CLINIC | Age: 45
End: 2025-07-16
Payer: COMMERCIAL

## 2025-07-31 ENCOUNTER — APPOINTMENT (OUTPATIENT)
Dept: GASTROENTEROLOGY | Facility: CLINIC | Age: 45
End: 2025-07-31
Payer: COMMERCIAL

## 2025-08-11 ENCOUNTER — APPOINTMENT (OUTPATIENT)
Dept: GASTROENTEROLOGY | Facility: CLINIC | Age: 45
End: 2025-08-11
Payer: COMMERCIAL

## 2025-09-05 ENCOUNTER — TELEPHONE (OUTPATIENT)
Dept: SURGERY | Facility: CLINIC | Age: 45
End: 2025-09-05
Payer: COMMERCIAL

## 2025-09-05 PROBLEM — Z98.84 BARIATRIC SURGERY STATUS: Status: ACTIVE | Noted: 2025-09-05

## 2025-09-08 ENCOUNTER — APPOINTMENT (OUTPATIENT)
Dept: SURGERY | Facility: CLINIC | Age: 45
End: 2025-09-08
Payer: COMMERCIAL

## 2025-09-08 DIAGNOSIS — E66.01 MORBID OBESITY (MULTI): ICD-10-CM

## 2025-09-08 DIAGNOSIS — Z98.84 BARIATRIC SURGERY STATUS: ICD-10-CM

## 2025-09-19 ENCOUNTER — APPOINTMENT (OUTPATIENT)
Dept: PRIMARY CARE | Facility: CLINIC | Age: 45
End: 2025-09-19
Payer: COMMERCIAL